# Patient Record
Sex: MALE | Race: WHITE | NOT HISPANIC OR LATINO | Employment: FULL TIME | URBAN - NONMETROPOLITAN AREA
[De-identification: names, ages, dates, MRNs, and addresses within clinical notes are randomized per-mention and may not be internally consistent; named-entity substitution may affect disease eponyms.]

---

## 2024-06-16 ENCOUNTER — HOSPITAL ENCOUNTER (EMERGENCY)
Facility: HOSPITAL | Age: 39
Discharge: HOME | End: 2024-06-16
Attending: EMERGENCY MEDICINE
Payer: COMMERCIAL

## 2024-06-16 ENCOUNTER — APPOINTMENT (OUTPATIENT)
Dept: RADIOLOGY | Facility: HOSPITAL | Age: 39
End: 2024-06-16
Payer: COMMERCIAL

## 2024-06-16 VITALS
HEIGHT: 70 IN | WEIGHT: 255 LBS | RESPIRATION RATE: 18 BRPM | SYSTOLIC BLOOD PRESSURE: 128 MMHG | DIASTOLIC BLOOD PRESSURE: 86 MMHG | OXYGEN SATURATION: 100 % | HEART RATE: 69 BPM | TEMPERATURE: 97.7 F | BODY MASS INDEX: 36.51 KG/M2

## 2024-06-16 DIAGNOSIS — S61.031A PUNCTURE WOUND OF RIGHT THUMB, INITIAL ENCOUNTER: Primary | ICD-10-CM

## 2024-06-16 PROCEDURE — 90715 TDAP VACCINE 7 YRS/> IM: CPT

## 2024-06-16 PROCEDURE — 73140 X-RAY EXAM OF FINGER(S): CPT | Mod: RIGHT SIDE | Performed by: STUDENT IN AN ORGANIZED HEALTH CARE EDUCATION/TRAINING PROGRAM

## 2024-06-16 PROCEDURE — 2500000001 HC RX 250 WO HCPCS SELF ADMINISTERED DRUGS (ALT 637 FOR MEDICARE OP)

## 2024-06-16 PROCEDURE — 90471 IMMUNIZATION ADMIN: CPT

## 2024-06-16 PROCEDURE — 99283 EMERGENCY DEPT VISIT LOW MDM: CPT | Mod: 25

## 2024-06-16 PROCEDURE — 2500000004 HC RX 250 GENERAL PHARMACY W/ HCPCS (ALT 636 FOR OP/ED)

## 2024-06-16 PROCEDURE — 73140 X-RAY EXAM OF FINGER(S): CPT | Mod: RT

## 2024-06-16 RX ORDER — IBUPROFEN 400 MG/1
400 TABLET ORAL ONCE
Status: COMPLETED | OUTPATIENT
Start: 2024-06-16 | End: 2024-06-16

## 2024-06-16 RX ORDER — IBUPROFEN 400 MG/1
TABLET ORAL
Status: COMPLETED
Start: 2024-06-16 | End: 2024-06-16

## 2024-06-16 ASSESSMENT — PAIN DESCRIPTION - ORIENTATION: ORIENTATION: RIGHT

## 2024-06-16 ASSESSMENT — PAIN - FUNCTIONAL ASSESSMENT: PAIN_FUNCTIONAL_ASSESSMENT: 0-10

## 2024-06-16 ASSESSMENT — PAIN SCALES - GENERAL: PAINLEVEL_OUTOF10: 4

## 2024-06-17 NOTE — ED PROVIDER NOTES
HPI   Chief Complaint   Patient presents with    Hand Injury         History provided by:  Patient   used: No         This patient presents to the emergency department ambulatory via private vehicle for evaluation of a right thumb injury.  Patient reports he was doing some DIY at home and using a power screwdriver with his left, nondominant hand to put a screw in on board and the screwdriver slipped off the screw and the bit went into distal right thumb radial aspect adjacent to the proximal eponychial fold.  It was not a through and through injury.  He says the screwdriver bit looked intact when he removed it.  He is complaining of throbbing pain in this area only.  No radiation of pain.  No numbness or tingling.  He says he did get lightheaded when he was trying to clean the wound.  He is not on any blood thinners.  He cannot recall his last tetanus immunization.  He denies any other injury.               No data recorded                   Patient History   Past Medical History:   Diagnosis Date    Other specified health status 06/19/2019    No known problems     History reviewed. No pertinent surgical history.  No family history on file.  Social History     Tobacco Use    Smoking status: Never    Smokeless tobacco: Never   Substance Use Topics    Alcohol use: Not on file    Drug use: Not on file       Physical Exam   ED Triage Vitals [06/16/24 1943]   Temperature Heart Rate Respirations BP   36.5 °C (97.7 °F) 73 18 132/89      SpO2 Temp src Heart Rate Source Patient Position   100 % -- -- --      BP Location FiO2 (%)     -- --       Physical Exam  Vitals reviewed.   Constitutional:       Appearance: Normal appearance.   HENT:      Head: Normocephalic.   Cardiovascular:      Rate and Rhythm: Normal rate and regular rhythm.      Pulses: Normal pulses.   Pulmonary:      Effort: Pulmonary effort is normal.   Musculoskeletal:         General: Tenderness and signs of injury present. Normal range of  motion.      Comments: Puncture wound right thumb proximal eponychial fold/cuticle area radial aspect of thumb, ecchymoses to palmar aspect of right thumb DIP. Normal ROM including flexion, extension, opposition.   Skin:     General: Skin is warm and dry.      Capillary Refill: Capillary refill takes less than 2 seconds.   Neurological:      General: No focal deficit present.      Mental Status: He is alert and oriented to person, place, and time.   Psychiatric:         Mood and Affect: Mood normal.       ED Medication Administration from 06/16/2024 1937 to 06/16/2024 2102         Date/Time Order Dose Route Action Action by     06/16/2024 1956 EDT diphth,pertus(acell),tetanus (BoostRIX) 2.5-8-5 Lf-mcg-Lf/0.5mL vaccine 0.5 mL 0.5 mL intramuscular Given Rohm, S     06/16/2024 1956 EDT ibuprofen tablet 400 mg 400 mg oral Given Rohm, S          XR fingers right 2+ views   Final Result   No evidence of fracture or traumatic malalignment of the 1st right   digit.             MACRO:   None        Signed by: Myra Ortiz 6/16/2024 8:54 PM   Dictation workstation:   [id number]            ED Course & MDM   ED Course as of 06/16/24 2104   Sun Jun 16, 2024 2057 Results reviewed with patient [MN]      ED Course User Index  [MN] Hanny Vásquez MD         Diagnoses as of 06/16/24 2104   Puncture wound of right thumb, initial encounter       Medical Decision Making  This patient presents to the emergency department with the above history and physical.  No evidence of active bleeding, deformity, neurovascular injury.  Exam consistent with puncture type wound that was not through and through.  Tetanus updated.  Wound care provided by nursing staff.  X-ray obtained to evaluate for bony injury.  This was read by radiology.  No evidence of acute bony injury.    Wound care instructions and concerns about developing infection, nail growth matrix injury, and lack of indication for surgical closure were discussed with  patient.  Bacitracin and protective splint were applied by nursing staff for additional comfort.    Results of exam and any testing were discussed with patient/family. To the best of my ability, I answered all questions. At this time, there is no indication for admission/transfer or further diagnostic testing. Patient understands to return for any new or worsening symptoms, or failure to improve as anticipated. The importance of follow-up was stressed.    Procedure  Procedures     Hanny Vásquez MD  06/16/24 1014

## 2024-06-17 NOTE — DISCHARGE INSTRUCTIONS
Elevated today however overall well controlled  Continue losartan 100 milligrams daily  Recheck in 1 month  Protect the wound from dirt and injury.    It is okay to shower.    Return to emergency department for fever, increased swelling, redness, drainage, red streaks going up your thumb.    There is no way to determine whether or not the nail growth matrix was injured, but given the location of the puncture wound you could anticipate that that the nail will grow out with a deformity.